# Patient Record
Sex: MALE | Race: BLACK OR AFRICAN AMERICAN | NOT HISPANIC OR LATINO | URBAN - METROPOLITAN AREA
[De-identification: names, ages, dates, MRNs, and addresses within clinical notes are randomized per-mention and may not be internally consistent; named-entity substitution may affect disease eponyms.]

---

## 2019-01-01 ENCOUNTER — EMERGENCY (EMERGENCY)
Facility: HOSPITAL | Age: 69
LOS: 0 days | Discharge: HOME | End: 2019-05-20
Attending: EMERGENCY MEDICINE
Payer: COMMERCIAL

## 2019-01-01 VITALS
RESPIRATION RATE: 17 BRPM | HEART RATE: 133 BPM | HEIGHT: 63 IN | WEIGHT: 119.05 LBS | OXYGEN SATURATION: 100 % | SYSTOLIC BLOOD PRESSURE: 92 MMHG | TEMPERATURE: 98 F | DIASTOLIC BLOOD PRESSURE: 68 MMHG

## 2019-01-01 VITALS
SYSTOLIC BLOOD PRESSURE: 116 MMHG | OXYGEN SATURATION: 100 % | RESPIRATION RATE: 17 BRPM | TEMPERATURE: 98 F | HEART RATE: 62 BPM | DIASTOLIC BLOOD PRESSURE: 62 MMHG

## 2019-01-01 DIAGNOSIS — R10.9 UNSPECIFIED ABDOMINAL PAIN: ICD-10-CM

## 2019-01-01 DIAGNOSIS — Z85.028 PERSONAL HISTORY OF OTHER MALIGNANT NEOPLASM OF STOMACH: ICD-10-CM

## 2019-01-01 DIAGNOSIS — R00.0 TACHYCARDIA, UNSPECIFIED: ICD-10-CM

## 2019-01-01 DIAGNOSIS — R53.1 WEAKNESS: ICD-10-CM

## 2019-01-01 LAB
APTT BLD: 27.2 SEC — LOW (ref 27.5–36.3)
BLD GP AB SCN SERPL QL: SIGNIFICANT CHANGE UP
CULTURE RESULTS: SIGNIFICANT CHANGE UP
CULTURE RESULTS: SIGNIFICANT CHANGE UP
HCT VFR BLD CALC: 31.6 % — LOW (ref 39–50)
HGB BLD-MCNC: 10.1 G/DL — LOW (ref 13–17)
INR BLD: 1.18 RATIO — HIGH (ref 0.88–1.16)
LACTATE SERPL-SCNC: 1.6 MMOL/L — SIGNIFICANT CHANGE UP (ref 0.7–2)
MCHC RBC-ENTMCNC: 27.4 PG — SIGNIFICANT CHANGE UP (ref 27–34)
MCHC RBC-ENTMCNC: 32 GM/DL — SIGNIFICANT CHANGE UP (ref 32–36)
MCV RBC AUTO: 85.9 FL — SIGNIFICANT CHANGE UP (ref 80–100)
NRBC # BLD: 0 /100 WBCS — SIGNIFICANT CHANGE UP (ref 0–0)
PLATELET # BLD AUTO: 206 K/UL — SIGNIFICANT CHANGE UP (ref 150–400)
PROTHROM AB SERPL-ACNC: 13.3 SEC — HIGH (ref 10–12.9)
RBC # BLD: 3.68 M/UL — LOW (ref 4.2–5.8)
RBC # FLD: 14.6 % — HIGH (ref 10.3–14.5)
SPECIMEN SOURCE: SIGNIFICANT CHANGE UP
SPECIMEN SOURCE: SIGNIFICANT CHANGE UP
WBC # BLD: 5.66 K/UL — SIGNIFICANT CHANGE UP (ref 3.8–10.5)
WBC # FLD AUTO: 5.66 K/UL — SIGNIFICANT CHANGE UP (ref 3.8–10.5)

## 2019-01-01 PROCEDURE — 74176 CT ABD & PELVIS W/O CONTRAST: CPT | Mod: 26

## 2019-01-01 PROCEDURE — 93010 ELECTROCARDIOGRAM REPORT: CPT

## 2019-01-01 PROCEDURE — 99285 EMERGENCY DEPT VISIT HI MDM: CPT

## 2019-01-01 RX ORDER — FENTANYL CITRATE 50 UG/ML
50 INJECTION INTRAVENOUS ONCE
Refills: 0 | Status: DISCONTINUED | OUTPATIENT
Start: 2019-01-01 | End: 2019-01-01

## 2019-01-01 RX ORDER — METOCLOPRAMIDE HCL 10 MG
10 TABLET ORAL ONCE
Refills: 0 | Status: COMPLETED | OUTPATIENT
Start: 2019-01-01 | End: 2019-01-01

## 2019-01-01 RX ORDER — IOHEXOL 300 MG/ML
30 INJECTION, SOLUTION INTRAVENOUS ONCE
Refills: 0 | Status: COMPLETED | OUTPATIENT
Start: 2019-01-01 | End: 2019-01-01

## 2019-01-01 RX ORDER — ESOMEPRAZOLE MAGNESIUM 40 MG/1
1 CAPSULE, DELAYED RELEASE ORAL
Qty: 0 | Refills: 0 | DISCHARGE

## 2019-01-01 RX ORDER — PIPERACILLIN AND TAZOBACTAM 4; .5 G/20ML; G/20ML
3.38 INJECTION, POWDER, LYOPHILIZED, FOR SOLUTION INTRAVENOUS ONCE
Refills: 0 | Status: DISCONTINUED | OUTPATIENT
Start: 2019-01-01 | End: 2019-01-01

## 2019-01-01 RX ORDER — DRONABINOL 2.5 MG
1 CAPSULE ORAL
Qty: 0 | Refills: 0 | DISCHARGE

## 2019-01-01 RX ORDER — ENOXAPARIN SODIUM 100 MG/ML
0 INJECTION SUBCUTANEOUS
Qty: 0 | Refills: 0 | DISCHARGE

## 2019-01-01 RX ORDER — ATORVASTATIN CALCIUM 80 MG/1
1 TABLET, FILM COATED ORAL
Qty: 0 | Refills: 0 | DISCHARGE

## 2019-01-01 RX ORDER — FAMOTIDINE 10 MG/ML
20 INJECTION INTRAVENOUS ONCE
Refills: 0 | Status: COMPLETED | OUTPATIENT
Start: 2019-01-01 | End: 2019-01-01

## 2019-01-01 RX ORDER — SODIUM CHLORIDE 9 MG/ML
1000 INJECTION INTRAMUSCULAR; INTRAVENOUS; SUBCUTANEOUS ONCE
Refills: 0 | Status: COMPLETED | OUTPATIENT
Start: 2019-01-01 | End: 2019-01-01

## 2019-01-01 RX ORDER — SITAGLIPTIN 50 MG/1
1 TABLET, FILM COATED ORAL
Qty: 0 | Refills: 0 | DISCHARGE

## 2019-01-01 RX ORDER — INSULIN DETEMIR 100/ML (3)
8 INSULIN PEN (ML) SUBCUTANEOUS
Qty: 0 | Refills: 0 | DISCHARGE

## 2019-01-01 RX ADMIN — IOHEXOL 30 MILLILITER(S): 300 INJECTION, SOLUTION INTRAVENOUS at 20:46

## 2019-05-19 NOTE — ED ADULT TRIAGE NOTE - CHIEF COMPLAINT QUOTE
Recent abdominal surgery 04/18/2019  at [facility],   reported generalized weakness , severe abdominal pain with nausea and vomiting, stated "I feel like i'm gonna pass out"  onset 1 hour ago

## 2019-05-19 NOTE — ED ADULT NURSE REASSESSMENT NOTE - NS ED NURSE REASSESS COMMENT FT1
Patient refuse for an insertion of an peripheral line, tried with another nurse, patient still refuse. Made Dr. Tegaue aware that patient would like to leave. Dr. Teague request for patient to have an CT scan to be done.  Dr. Teague, had discontinue the antibiotics .  Made patient aware of the next POC. Will continue to monitor.

## 2019-05-19 NOTE — ED PROVIDER NOTE - NSFOLLOWUPCLINICS_GEN_ALL_ED_FT
[facility]  Hematology/Oncology  [address] [id number]  Phone: [phone]  Fax:   Follow Up Time: 4-6 Days

## 2019-05-19 NOTE — ED PROVIDER NOTE - PROGRESS NOTE DETAILS
Results reported to patient--grossly benign, labs wnl (some labs clotted, pt. refusing redraw as he feels better)  Pt. reports feeling better after meds  pt. agrees to f/u with primary care outpt. as well as private heme/onc and surgery/GI  pt. understands to return to ED if symptoms worsen; will d/c

## 2019-05-19 NOTE — ED PROVIDER NOTE - CLINICAL SUMMARY MEDICAL DECISION MAKING FREE TEXT BOX
69 yo M with acute abd pain, r/o acute abd given history, doubt pancreatitis/ischemia/obstruction/ACS  -basic labs, coags, trop, ckmb, blood cx, lactate, lipase, type and screen, CT ab/pel, ekg, cardiac monitor, iv, NS hydration bolus, zosyn, pepcid/reglan/fentanyl prn, PO contrast if tolerable   -f/u results, reeval

## 2019-05-19 NOTE — ED ADULT NURSE REASSESSMENT NOTE - NS ED NURSE REASSESS COMMENT FT1
Patient refuse for insertion of peripheral line, and refuse for redraw of  the green top. Patient anxiously wanting to leave the ER. Awaiting for CT scan to be done.

## 2019-05-19 NOTE — ED PROVIDER NOTE - PHYSICAL EXAMINATION
Vitals: tachy at 128, otherwise WNL  Gen: AAOx3, NAD, sitting uncomfortably in stretcher, non-toxic, answering questions appropriately   Head: ncat, perrla, eomi b/l  Neck: supple, no lymphadenopathy, no midline deviation  Heart: rrr, no m/r/g  Lungs: CTA b/l, no rales/ronchi/wheezes  Abd: soft, generaly tender, but non-distended, no rebound or guarding  Ext: no clubbing/cyanosis/edema  Neuro: sensation and muscle strength intact b/l, no focal weakness

## 2020-01-01 ENCOUNTER — EMERGENCY (EMERGENCY)
Facility: HOSPITAL | Age: 70
LOS: 1 days | Discharge: HOME | End: 2020-01-01
Attending: EMERGENCY MEDICINE | Admitting: INTERNAL MEDICINE
Payer: MEDICARE

## 2020-01-01 VITALS
RESPIRATION RATE: 17 BRPM | SYSTOLIC BLOOD PRESSURE: 132 MMHG | HEART RATE: 85 BPM | DIASTOLIC BLOOD PRESSURE: 89 MMHG | TEMPERATURE: 99 F | OXYGEN SATURATION: 98 %

## 2020-01-01 VITALS
RESPIRATION RATE: 16 BRPM | TEMPERATURE: 97 F | SYSTOLIC BLOOD PRESSURE: 106 MMHG | DIASTOLIC BLOOD PRESSURE: 70 MMHG | OXYGEN SATURATION: 86 % | HEART RATE: 56 BPM

## 2020-01-01 DIAGNOSIS — Z29.9 ENCOUNTER FOR PROPHYLACTIC MEASURES, UNSPECIFIED: ICD-10-CM

## 2020-01-01 DIAGNOSIS — Z02.9 ENCOUNTER FOR ADMINISTRATIVE EXAMINATIONS, UNSPECIFIED: ICD-10-CM

## 2020-01-01 DIAGNOSIS — D50.0 IRON DEFICIENCY ANEMIA SECONDARY TO BLOOD LOSS (CHRONIC): ICD-10-CM

## 2020-01-01 DIAGNOSIS — E11.22 TYPE 2 DIABETES MELLITUS WITH DIABETIC CHRONIC KIDNEY DISEASE: ICD-10-CM

## 2020-01-01 DIAGNOSIS — Z71.89 OTHER SPECIFIED COUNSELING: ICD-10-CM

## 2020-01-01 DIAGNOSIS — C16.9 MALIGNANT NEOPLASM OF STOMACH, UNSPECIFIED: ICD-10-CM

## 2020-01-01 DIAGNOSIS — R64 CACHEXIA: ICD-10-CM

## 2020-01-01 DIAGNOSIS — R62.7 ADULT FAILURE TO THRIVE: ICD-10-CM

## 2020-01-01 DIAGNOSIS — Z90.3 ACQUIRED ABSENCE OF STOMACH [PART OF]: Chronic | ICD-10-CM

## 2020-01-01 DIAGNOSIS — G93.41 METABOLIC ENCEPHALOPATHY: ICD-10-CM

## 2020-01-01 LAB
ALBUMIN SERPL ELPH-MCNC: 3 G/DL — LOW (ref 3.3–5)
ALP SERPL-CCNC: 829 U/L — HIGH (ref 40–120)
ALT FLD-CCNC: 44 U/L — HIGH (ref 4–41)
AMMONIA BLD-MCNC: 25 UMOL/L — SIGNIFICANT CHANGE UP (ref 11–55)
ANION GAP SERPL CALC-SCNC: 20 MMO/L — HIGH (ref 7–14)
ANISOCYTOSIS BLD QL: SLIGHT — SIGNIFICANT CHANGE UP
AST SERPL-CCNC: 64 U/L — HIGH (ref 4–40)
BASE EXCESS BLDV CALC-SCNC: 0.5 MMOL/L — SIGNIFICANT CHANGE UP
BASOPHILS # BLD AUTO: 0.02 K/UL — SIGNIFICANT CHANGE UP (ref 0–0.2)
BASOPHILS NFR BLD AUTO: 0.1 % — SIGNIFICANT CHANGE UP (ref 0–2)
BASOPHILS NFR SPEC: 0 % — SIGNIFICANT CHANGE UP (ref 0–2)
BILIRUB SERPL-MCNC: 1.7 MG/DL — HIGH (ref 0.2–1.2)
BLD GP AB SCN SERPL QL: NEGATIVE — SIGNIFICANT CHANGE UP
BLOOD GAS VENOUS - CREATININE: SIGNIFICANT CHANGE UP MG/DL (ref 0.5–1.3)
BLOOD GAS VENOUS - FIO2: 21 — SIGNIFICANT CHANGE UP
BUN SERPL-MCNC: 111 MG/DL — HIGH (ref 7–23)
CALCIUM SERPL-MCNC: 8.5 MG/DL — SIGNIFICANT CHANGE UP (ref 8.4–10.5)
CHLORIDE BLDV-SCNC: 100 MMOL/L — SIGNIFICANT CHANGE UP (ref 96–108)
CHLORIDE SERPL-SCNC: 94 MMOL/L — LOW (ref 98–107)
CO2 SERPL-SCNC: 20 MMOL/L — LOW (ref 22–31)
CREAT SERPL-MCNC: 1.53 MG/DL — HIGH (ref 0.5–1.3)
EOSINOPHIL # BLD AUTO: 0 K/UL — SIGNIFICANT CHANGE UP (ref 0–0.5)
EOSINOPHIL NFR BLD AUTO: 0 % — SIGNIFICANT CHANGE UP (ref 0–6)
EOSINOPHIL NFR FLD: 0 % — SIGNIFICANT CHANGE UP (ref 0–6)
GAS PNL BLDV: 134 MMOL/L — LOW (ref 136–146)
GLUCOSE BLDV-MCNC: 67 MG/DL — LOW (ref 70–99)
GLUCOSE SERPL-MCNC: 64 MG/DL — LOW (ref 70–99)
HCO3 BLDV-SCNC: 25 MMOL/L — SIGNIFICANT CHANGE UP (ref 20–27)
HCT VFR BLD CALC: 17.9 % — CRITICAL LOW (ref 39–50)
HCT VFR BLD CALC: 31.4 % — LOW (ref 39–50)
HCT VFR BLDV CALC: 16.1 % — CRITICAL LOW (ref 39–51)
HGB BLD-MCNC: 10.8 G/DL — LOW (ref 13–17)
HGB BLD-MCNC: 5.6 G/DL — CRITICAL LOW (ref 13–17)
HGB BLDV-MCNC: 5.1 G/DL — CRITICAL LOW (ref 13–17)
IMM GRANULOCYTES NFR BLD AUTO: 1.6 % — HIGH (ref 0–1.5)
LACTATE BLDV-MCNC: 4.8 MMOL/L — CRITICAL HIGH (ref 0.5–2)
LG PLATELETS BLD QL AUTO: SLIGHT — SIGNIFICANT CHANGE UP
LYMPHOCYTES # BLD AUTO: 0.43 K/UL — LOW (ref 1–3.3)
LYMPHOCYTES # BLD AUTO: 2.1 % — LOW (ref 13–44)
LYMPHOCYTES NFR SPEC AUTO: 4 % — LOW (ref 13–44)
MACROCYTES BLD QL: SLIGHT — SIGNIFICANT CHANGE UP
MANUAL SMEAR VERIFICATION: SIGNIFICANT CHANGE UP
MCHC RBC-ENTMCNC: 30.3 PG — SIGNIFICANT CHANGE UP (ref 27–34)
MCHC RBC-ENTMCNC: 30.4 PG — SIGNIFICANT CHANGE UP (ref 27–34)
MCHC RBC-ENTMCNC: 31.3 % — LOW (ref 32–36)
MCHC RBC-ENTMCNC: 34.4 % — SIGNIFICANT CHANGE UP (ref 32–36)
MCV RBC AUTO: 88.2 FL — SIGNIFICANT CHANGE UP (ref 80–100)
MCV RBC AUTO: 97.3 FL — SIGNIFICANT CHANGE UP (ref 80–100)
MICROCYTES BLD QL: SLIGHT — SIGNIFICANT CHANGE UP
MONOCYTES # BLD AUTO: 0.57 K/UL — SIGNIFICANT CHANGE UP (ref 0–0.9)
MONOCYTES NFR BLD AUTO: 2.8 % — SIGNIFICANT CHANGE UP (ref 2–14)
MONOCYTES NFR BLD: 2 % — SIGNIFICANT CHANGE UP (ref 2–9)
NEUTROPHIL AB SER-ACNC: 89 % — HIGH (ref 43–77)
NEUTROPHILS # BLD AUTO: 18.95 K/UL — HIGH (ref 1.8–7.4)
NEUTROPHILS NFR BLD AUTO: 93.4 % — HIGH (ref 43–77)
NEUTS BAND # BLD: 5 % — SIGNIFICANT CHANGE UP (ref 0–6)
NRBC # BLD: 0 /100WBC — SIGNIFICANT CHANGE UP
NRBC # FLD: 0.07 K/UL — SIGNIFICANT CHANGE UP (ref 0–0)
NRBC # FLD: 0.09 K/UL — SIGNIFICANT CHANGE UP (ref 0–0)
PCO2 BLDV: 40 MMHG — LOW (ref 41–51)
PH BLDV: 7.41 PH — SIGNIFICANT CHANGE UP (ref 7.32–7.43)
PLATELET # BLD AUTO: 36 K/UL — LOW (ref 150–400)
PLATELET # BLD AUTO: 53 K/UL — LOW (ref 150–400)
PLATELET COUNT - ESTIMATE: SIGNIFICANT CHANGE UP
PMV BLD: SIGNIFICANT CHANGE UP FL (ref 7–13)
PMV BLD: SIGNIFICANT CHANGE UP FL (ref 7–13)
PO2 BLDV: 44 MMHG — HIGH (ref 35–40)
POLYCHROMASIA BLD QL SMEAR: SLIGHT — SIGNIFICANT CHANGE UP
POTASSIUM BLDV-SCNC: 4.5 MMOL/L — SIGNIFICANT CHANGE UP (ref 3.4–4.5)
POTASSIUM SERPL-MCNC: 5.3 MMOL/L — SIGNIFICANT CHANGE UP (ref 3.5–5.3)
POTASSIUM SERPL-SCNC: 5.3 MMOL/L — SIGNIFICANT CHANGE UP (ref 3.5–5.3)
PROT SERPL-MCNC: 6.2 G/DL — SIGNIFICANT CHANGE UP (ref 6–8.3)
RBC # BLD: 1.84 M/UL — LOW (ref 4.2–5.8)
RBC # BLD: 3.56 M/UL — LOW (ref 4.2–5.8)
RBC # FLD: 18.6 % — HIGH (ref 10.3–14.5)
RBC # FLD: 25.6 % — HIGH (ref 10.3–14.5)
RH IG SCN BLD-IMP: POSITIVE — SIGNIFICANT CHANGE UP
RH IG SCN BLD-IMP: POSITIVE — SIGNIFICANT CHANGE UP
SAO2 % BLDV: 76.8 % — SIGNIFICANT CHANGE UP (ref 60–85)
SCHISTOCYTES BLD QL AUTO: SIGNIFICANT CHANGE UP
SODIUM SERPL-SCNC: 134 MMOL/L — LOW (ref 135–145)
SPECIMEN SOURCE: SIGNIFICANT CHANGE UP
WBC # BLD: 19.6 K/UL — HIGH (ref 3.8–10.5)
WBC # BLD: 19.76 K/UL — HIGH (ref 3.8–10.5)
WBC # FLD AUTO: 19.6 K/UL — HIGH (ref 3.8–10.5)
WBC # FLD AUTO: 19.76 K/UL — HIGH (ref 3.8–10.5)

## 2020-01-01 PROCEDURE — 99285 EMERGENCY DEPT VISIT HI MDM: CPT | Mod: 25

## 2020-01-01 PROCEDURE — 76937 US GUIDE VASCULAR ACCESS: CPT | Mod: 26,RT

## 2020-01-01 RX ORDER — MORPHINE SULFATE 50 MG/1
2 CAPSULE, EXTENDED RELEASE ORAL ONCE
Refills: 0 | Status: DISCONTINUED | OUTPATIENT
Start: 2020-01-01 | End: 2020-01-01

## 2020-01-01 RX ORDER — HYDROMORPHONE HYDROCHLORIDE 2 MG/ML
0.5 INJECTION INTRAMUSCULAR; INTRAVENOUS; SUBCUTANEOUS
Refills: 0 | Status: DISCONTINUED | OUTPATIENT
Start: 2020-01-01 | End: 2020-01-01

## 2020-01-01 RX ORDER — SODIUM CHLORIDE 9 MG/ML
1000 INJECTION, SOLUTION INTRAVENOUS ONCE
Refills: 0 | Status: COMPLETED | OUTPATIENT
Start: 2020-01-01 | End: 2020-01-01

## 2020-01-01 RX ORDER — ACETAMINOPHEN 500 MG
325 TABLET ORAL EVERY 4 HOURS
Refills: 0 | Status: DISCONTINUED | OUTPATIENT
Start: 2020-01-01 | End: 2020-01-01

## 2020-01-01 RX ORDER — SODIUM CHLORIDE 9 MG/ML
1000 INJECTION, SOLUTION INTRAVENOUS
Refills: 0 | Status: DISCONTINUED | OUTPATIENT
Start: 2020-01-01 | End: 2020-01-01

## 2020-01-01 RX ORDER — HYDROMORPHONE HYDROCHLORIDE 2 MG/ML
0.5 INJECTION INTRAMUSCULAR; INTRAVENOUS; SUBCUTANEOUS
Qty: 0 | Refills: 0 | DISCHARGE
Start: 2020-01-01

## 2020-01-01 RX ORDER — OXYCODONE HYDROCHLORIDE 5 MG/1
5 TABLET ORAL EVERY 4 HOURS
Refills: 0 | Status: DISCONTINUED | OUTPATIENT
Start: 2020-01-01 | End: 2020-01-01

## 2020-01-01 RX ADMIN — SODIUM CHLORIDE 1000 MILLILITER(S): 9 INJECTION, SOLUTION INTRAVENOUS at 18:42

## 2020-01-01 RX ADMIN — MORPHINE SULFATE 2 MILLIGRAM(S): 50 CAPSULE, EXTENDED RELEASE ORAL at 23:23

## 2020-01-01 RX ADMIN — SODIUM CHLORIDE 50 MILLILITER(S): 9 INJECTION, SOLUTION INTRAVENOUS at 08:36

## 2020-01-01 RX ADMIN — MORPHINE SULFATE 2 MILLIGRAM(S): 50 CAPSULE, EXTENDED RELEASE ORAL at 23:08

## 2020-01-12 NOTE — ED PROVIDER NOTE - FAMILY DETAILS FREE TEXT FOR MDM ADDL HISTORY OBTAINED FROM QUESTION
extensive goals of care discussion.  pt doesn't want parenteral feeds, has been consistent in saying he does not.    also no cpr, no intubation.  discussed concept of making him comfortable and family is considering hospice

## 2020-01-12 NOTE — ED PROVIDER NOTE - PHYSICAL EXAMINATION
*GEN:   very pale, jaundiced, uncomfortable, AOx3  *EYES:   pupils equally round and reactive to light, extra-occular movements intact  *HEENT:   airway patent, moist mucosal membranes, full ROM neck  *CV:   regular rate and rhythm  *RESP:   clear to auscultation bilaterally, non-labored  *ABD:   exquisitely tender to palpation throughout  *:   no cva/flank tenderness  *EXTREM:   no MSK tenderness, full ROM throughout, no leg swelling  *SKIN:   dry, intact  *NEURO:   AOx3, no focal weakness or loss of sensation

## 2020-01-12 NOTE — ED PROVIDER NOTE - CLINICAL SUMMARY MEDICAL DECISION MAKING FREE TEXT BOX
sick but stable appearing 69M w/ cancer - will check CTAP for intra-abd pathology, CTH given confusion, labs, further discuss goals of care with family sick but stable appearing 69M w/ cancer - will check CTAP for intra-abd pathology, CTH given confusion, labs, further discuss goals of care with family    aracelis: pt here with gastric cancer, decreased po over the last week.  family wife, daughter CATHY all present  extensive goals of care discussion.  pt doesn't want parenteral feeds, has been consistent in saying he does not.  pt interested in hospice.    also no cpr, no intubation.  discussed concept of making him comfortable and family is considering hospice

## 2020-01-12 NOTE — ED ADULT NURSE REASSESSMENT NOTE - NS ED NURSE REASSESS COMMENT FT1
blood transfusion initiated, checks doen bedside with second RN Shaq, consent in chart, pt and family educated on signs/symptoms of reaction tolerating well will continue to monitor

## 2020-01-12 NOTE — ED PROVIDER NOTE - CARE PLAN
Principal Discharge DX:	Cachexia  Secondary Diagnosis:	Malignant neoplasm of stomach, unspecified location

## 2020-01-12 NOTE — ED ADULT TRIAGE NOTE - CHIEF COMPLAINT QUOTE
Pt with change in mental status since last night , and ? low BP  108/72./  Pt with hx stomach ca  stage 4 mets to kidney

## 2020-01-12 NOTE — ED PROVIDER NOTE - OBJECTIVE STATEMENT
69M w/ pmh gastric CA s/p gastrectomy, s/p chemo/radiation (but stopped 1 yr ago) -- bib family for decr po x 1 wk, intermittent confusion x 2 days. Complexion more pale / yellow than baseline as well past few days. +chronic abd pain. No fever, n/v/d/c, chest pain, cough, sob, dizziness, dysuria/hematuria. No recent travel, medication change, illness, or hospitalization.     Preliminary goals of care discussion: want medications / fluid interventions, would want cpr, likely no surgical procedure, no intubation.

## 2020-01-12 NOTE — ED PROVIDER NOTE - ATTENDING CONTRIBUTION TO CARE
aracelis: pt here with gastric cancer, decreased po over the last week.  family wife, daughter CATHY all present  extensive goals of care discussion.  pt doesn't want parenteral feeds, has been consistent in saying he does not.  pt interested in hospice.

## 2020-01-12 NOTE — ED ADULT NURSE NOTE - OBJECTIVE STATEMENT
Pt rec'd to ED R#10, brought in by family for failure to thrive and AMS. As per family, pt has been having very minimal PO intake x1 week, since last night pt not acting himself and lethargic as per family. Family tried to obtain BP at home and were unable to. Pt denies any abdominal pain/ N/V/ CP/ SOB. Pt appears very frail, cachectic, jaundice, pale. VSS upon arrival to room, satting well on 2L NC. IV inserted, pt placed on CCM. Fluids infusing as per EMAR. Bed in lowest locked position, call bell within reach. Family at bedside.

## 2020-01-12 NOTE — ED PROVIDER NOTE - PROGRESS NOTE DETAILS
Resident Hersimran: Pt received as a sign out. Family at bedside (including wife and brother). Pt is informed about low hgb. He would like to receive blood transfusion. Risks and benefits are discussed. All questions are answered. Blood transfusion form is signed. Resident Caleb: MOLST form completed -placed in the chart: DNR, DNI, no tube feeds  Discussed case with Johanne (Nurse) - [facility] - will call the family and see the patient on floor tomorrow.   Resident Caleb Kat: Discussed the case with Dr. Mccracken - agrees with admission. MAR is paged.

## 2020-01-12 NOTE — ED ADULT NURSE REASSESSMENT NOTE - NS ED NURSE REASSESS COMMENT FT1
received report from SAM Whitney pt aox3 in no apparent distress VSS denies complaints labs sent will continue to monitor

## 2020-01-12 NOTE — ED PROCEDURE NOTE - ATTENDING CONTRIBUTION TO CARE
Attending Attestation: Dr. Mack BRISCOE supervised the PA and was personally present during key portions of the procedure.

## 2020-01-13 NOTE — H&P ADULT - PROBLEM SELECTOR PLAN 4
-Likely GI losses given elevated BUN and no external losses  -Transfuse for comfort  -Repeat CBC/lactate to determine if needs additional blood

## 2020-01-13 NOTE — DISCHARGE NOTE PROVIDER - NSDCCPCAREPLAN_GEN_ALL_CORE_FT
PRINCIPAL DISCHARGE DIAGNOSIS  Diagnosis: Cachexia  Assessment and Plan of Treatment: - discussed goals of care with patient and spouse. Patient is DNR/DNI.  MOLST form in chart.. Patient was evaluated by Hospice team. Patient admitted to [facility]      SECONDARY DISCHARGE DIAGNOSES  Diagnosis: Malignant neoplasm of stomach, unspecified location  Assessment and Plan of Treatment: - discussed goals of care with patient and spouse. Patient is DNR/DNI.  MOLST form in chart.. Patient was evaluated by Hospice team. Patient admitted to [facility]

## 2020-01-13 NOTE — H&P ADULT - PROBLEM SELECTOR PLAN 3
-2/2 anemia and underlying malignancy  -Likely transitioning to hospice per discussion with wife  -Pain control, regular diet, hospice evaluation as above

## 2020-01-13 NOTE — DISCHARGE NOTE PROVIDER - HOSPITAL COURSE
68 yo metastatic gastric cancer p/w FTT, anemia, and encephalopathy.  Getting blood but likely transition to hospice        Goals of care, counseling/discussion- ED team discussed goals of care with patient and patient is DNR/DNI.  MOLST form in chart.. Hospice  was called, discussed with patient's wife. Pt to be admitted to [facility] inpatient Hospice care        Metabolic encephalopathy- Highest suspicion for uremic encephalopathy given initial labs    - Has returned to baseline per wife        Failure to thrive in adult.- 2/2 anemia and underlying malignancy    - transitioning to hospice per discussion with wife. Pain control, regular diet, hospice evaluation as above        Anemia due to blood loss-Likely GI losses given elevated BUN and no external losses    -Transfuse for comfort. Pt s/p 2u PRBC on 1/12/20        Malignant neoplasm of stomach -Reportedly follows with oncologist at [facility]    - Pain management - Istop reviewed [id number] - percocet 5/325 at outpatient last by Dr. Sotelo    Per discussion with Attending and Hospice team pt ready to be discharged to in hospice today 68 yo metastatic gastric cancer p/w FTT, anemia, and encephalopathy.              Metabolic encephalopathy- Highest suspicion for uremic encephalopathy given initial labs    - Has returned to baseline per wife        Severe protein calorie malnutrition .- 2/2 underlying malignancy    - transitioning to hospice per discussion with wife. Pain control, regular diet, hospice evaluation as above        Anemia due to blood loss-Likely GI losses 2/2 gastric ca given elevated BUN and no external losses    -Transfuse for comfort. Pt s/p 2u PRBC on 1/12/20        Malignant neoplasm of stomach -Reportedly follows with oncologist at [facility]. No longer on any disease modifying therapy    -CT abd/pelvis showed widely spread stage 4 disease.     - Pain management - Istop reviewed [id number] - percocet 5/325 at outpatient last by Dr. Sotelo.     -started iv diaudid prn for cancer pain.         Advanced care planning     GOC discussed with wife- consented for DNI/DNR. No enteral feeding. Comfort care only. MOLST form in chart. Pt was evaluated by hospice service and was  discharged to inpt hospice today

## 2020-01-13 NOTE — H&P ADULT - PROBLEM SELECTOR PLAN 6
-Not on any medications  -Given BG low rather than high, will start D5NS at 50 for supplementation  -Given possible transition to hospice, will not order SSI/acck

## 2020-01-13 NOTE — H&P ADULT - PROBLEM SELECTOR PLAN 8
1.  Name of PCP:  2.  PCP Contacted on Admission: [ ] Y    [ ] N    3.  PCP contacted at Discharge: [ ] Y    [ ] N    [ ] N/A  4.  Post-Discharge Appointment Date and Location:  5.  Summary of Handoff given to PCP: 1.  Name of PCP: Dr. Sotelo  2.  PCP Contacted on Admission: [ ] Y    [x] N    3.  PCP contacted at Discharge: [ ] Y    [ ] N    [ ] N/A  4.  Post-Discharge Appointment Date and Location:  5.  Summary of Handoff given to PCP:

## 2020-01-13 NOTE — H&P ADULT - HISTORY OF PRESENT ILLNESS
Denis Romano is a 69 year old man with a history of gastric cancer widely metastatic and diabetes not on medications who presents for failure to thrive and confusion.    Patient was sleepy after a dose of morphine in the ED and declined to provide history and asked that questions be directed to wife, so all history obtained from patient's wife.    She reports he was in his usual state of chronic poor health until about 3 days ago when his sister passed away from breast cancer.  He subsequently had worsened abdominal pain, poor PO intake, and confusion characterized by hallucinations.  She did not note any black tarry stools or external bleeding.  His confusion was intermittent.  Due to these symptoms, she brought him into [facility] ED for further evaluation.    In the ED, he was afebrile with low saturation to 86% on RA improved to 100% on 2-4 L.  Diagnostics revealed a WBC of 19.6, hgb 5.6, platelet count 53,  w/ Cr 1.53, and elevated LFTs.  CT a/p showed gastrectomy with widely metastatic malignancy with concern for osseous mets, periotenal carcinomatosis, pulmonary and perinephric mets.  He was given LR 1 L and morphine 2 and 2 units of pRBC.  The ED also had GOC discussion and patient and wife elected for DNR/DNI and hospice evaluation in the morning.  Hospice was called by the ED and per their note will see him in the daytime.  He was admitted for further management.    On evaluation, he said he felt better but asked that his wife answer questions.

## 2020-01-13 NOTE — ED ADULT NURSE REASSESSMENT NOTE - NS ED NURSE REASSESS COMMENT FT1
blood ran over 2 hrs. md walton aware and approved. no transfusion reactions. vss. enodrsed to primary silvana delcid

## 2020-01-13 NOTE — GOALS OF CARE CONVERSATION - ADVANCED CARE PLANNING - CONVERSATION DETAILS
[facility] - Consents signed. Pt has been approved for an InPt bed and a bed is available at [facility] for today. Hospice is ready to render car.e

## 2020-01-13 NOTE — DISCHARGE NOTE NURSING/CASE MANAGEMENT/SOCIAL WORK - PATIENT PORTAL LINK FT
You can access the FollowMyHealth Patient Portal offered by [facility] by registering at the following website: [email or web address]. By joining [employer]’s FollowMyHealth portal, you will also be able to view your health information using other applications (apps) compatible with our system.

## 2020-01-13 NOTE — H&P ADULT - PROBLEM SELECTOR PLAN 2
-Highest suspicion for uremic encephalopathy given initial labs  -Has returned to baseline per wife  -Will defer further evaluation given current goals of care

## 2020-01-13 NOTE — H&P ADULT - NSHPPHYSICALEXAM_GEN_ALL_CORE
Physical exam:  Constitutional-Vitals signs reviewed and afebrile, HR , bp 106-148/, rr 14-20, satting 86% initially improved to 100% on 2L,asleep but awakens easily, not in acute distress, cachectic  Neuro-asleep but awakens easily, does not wish to answer questions, moving all extremities, face symmetric  Eyes-pupils equally round and reactive to light, non icteric, no discharge noted  Ears, nose, mouth, throat-no abnormal discharge, moist mucous membranes, oropharynx clear without noted exudate  CV-regular rate and rhythm, no rubs, murmurs, gallops appreciated,   Resp-clear to auscultation bilaterally, normal respiratory effort,   GI-abdomen tender throughout,   -not examined  MSK-normal range of motion of bilateral elbows and knees, no obvious deformities   Skin-warm, dry, no rash appreciated  Psych-calm, did not wish to answer questions, normal affect, not attending to internal stimuli

## 2020-01-13 NOTE — H&P ADULT - PROBLEM SELECTOR PLAN 1
-ED team discussed goals of care with patient and patient is DNR/DNI with plans to meet with hospice team in the daytime.  MOLST form in chart.  -Confirmed DNR/DNI desire for hospice discussion with patient's wife  -Given current goals of care will minimize additional workup and interventions  -Continue goals of care discussions in the daytime

## 2020-01-13 NOTE — H&P ADULT - PROBLEM SELECTOR PLAN 5
-Reportedly follows with oncologist at [facility]  -Ongoing goals of care as above  -Pain managment -Reportedly follows with oncologist at [facility]  -Ongoing goals of care as above  -Pain management - Istop reviewed [id number] - percocet 5/325 at outpatient last by Dr. Sotelo

## 2020-01-13 NOTE — DISCHARGE NOTE PROVIDER - NSDCMRMEDTOKEN_GEN_ALL_CORE_FT
acetaminophen 650 mg rectal suppository: 1 suppository(ies) rectal every 6 hours, As Needed  HYDROmorphone: 0.5 milligram(s) intravenous every 3 hours, As Needed

## 2020-01-13 NOTE — H&P ADULT - ASSESSMENT
69 year old man with a history of gastric cancer widely metastatic and diabetes not on medications who presents for failure to thrive and confusion.

## 2020-01-13 NOTE — H&P ADULT - NSHPLABSRESULTS_GEN_ALL_CORE
Diagnostics reviewed and remarkable for:  CBC w/ WBC 19.6, hgb 5.6, platelet 53  BMP w/ , Cr 1.53, AG 20  NH3 25  LFT w/ albumin 3, bili 1.7, AST 64, ALT 44, alk phos 829  Lipase 7.4  Mg 2.5, phos 6.2  VBG 7.41/40/25 lactate 4.8  CT a/p w/ gastrectomy and extensive mets (bones, peritoneum, lungs, perinephric)

## 2020-01-13 NOTE — H&P ADULT - PROBLEM SELECTOR PLAN 7
Likely ongoing bleeding and planning to transition to hospice, low benefit of pharmacologic prophylaxis.  Improve score 3 with elevated VTE risk.  SCD for prophylaxis.

## 2020-01-15 LAB
-  AMPICILLIN: SIGNIFICANT CHANGE UP
-  CIPROFLOXACIN: SIGNIFICANT CHANGE UP
-  NITROFURANTOIN: SIGNIFICANT CHANGE UP
-  TETRACYCLINE: SIGNIFICANT CHANGE UP
-  VANCOMYCIN: SIGNIFICANT CHANGE UP
BACTERIA UR CULT: SIGNIFICANT CHANGE UP
METHOD TYPE: SIGNIFICANT CHANGE UP
ORGANISM # SPEC MICROSCOPIC CNT: SIGNIFICANT CHANGE UP
ORGANISM # SPEC MICROSCOPIC CNT: SIGNIFICANT CHANGE UP

## 2020-01-15 RX ORDER — ACETAMINOPHEN 500 MG
1 TABLET ORAL
Qty: 0 | Refills: 0 | DISCHARGE

## 2020-03-26 NOTE — ED PROVIDER NOTE - NS_BEDUNITTYPES_ED_ALL_ED
"Patient: Alex Brown    Procedure Summary     Date:  03/25/20 Room / Location:  [facility] OR  / [facility] MAIN OR    Anesthesia Start:  2155 Anesthesia Stop:  2342    Procedure:  EXPLORATORY LAPAROTOMY,OPEN ADHESIOLYSIS,REPAIR OF PERINEUM (N/A Abdomen) Diagnosis:      Surgeon:  Kali Wilcox MD Provider:  Sabino Guy MD    Anesthesia Type:  general ASA Status:  2 - Emergent          Anesthesia Type: general    Vitals  Vitals Value Taken Time   /87 3/26/2020 12:50 AM   Temp 36.9 °C (98.4 °F) 3/25/2020 11:41 PM   Pulse 88 3/26/2020  1:02 AM   Resp 16 3/25/2020 11:41 PM   SpO2 98 % 3/26/2020  1:02 AM   Vitals shown include unvalidated device data.        Post Anesthesia Care and Evaluation    Patient location during evaluation: bedside  Patient participation: complete - patient participated  Level of consciousness: awake and alert  Pain management: adequate  Airway patency: patent  Anesthetic complications: No anesthetic complications    Cardiovascular status: acceptable  Respiratory status: acceptable  Hydration status: acceptable    Comments: /93 (BP Location: Left arm, Patient Position: Lying)   Pulse 100   Temp 36.9 °C (98.4 °F) (Oral)   Resp 16   Ht 167.6 cm (66\")   Wt 44.9 kg (99 lb)   SpO2 98%   BMI 15.98 kg/m²       " MEDICINE

## 2020-12-24 NOTE — ED PROVIDER NOTE - OBJECTIVE STATEMENT
67 yo M with acute abd pain after eating.  Pt. had stomach resection last month and [facility] for stomach cancer.  Scheduled to receive radiation treatment started next week if he gains enough weight.  Since surgery, pt. had frequent abd pain after eating and chronic diarrhea.  Pt. became very concerned because after eating this evening, pt. experience severe, acute, stabbing abd pain in epigastrium, which prompted hospital visit.  + nausea and vomiting.  Pt. admits now that he's in ER, symptoms have calmed down significantly.  Pt. admits he has a high tolerance for pain and prefers to avoid taking pain medication--"he has plenty at home."  No other complaints or inciting event.  Pt. is otherwise at baseline.  ROS: negative for fever, cough, headache, chest pain, shortness of breath, diarrhea, rash, paresthesia, and weakness--all other systems reviewed are negative.   PMH: stomach CA s/p resection; Meds: Forgets names, See EMR when list obtained from family; SH: Denies smoking/drinking/drug use
Less than or equal to Normal Limit

## 2022-12-16 NOTE — H&P ADULT - PROBLEM SELECTOR PROBLEM 6
no Type 2 diabetes mellitus with stage 3 chronic kidney disease, without long-term current use of insulin

## 2025-07-18 NOTE — ED ADULT NURSE NOTE - NSFALLRSKINDICATORS_ED_ALL_ED
Rounded on patient.  Patient awake in bed watching TV with knee straight. Patient ambulated to bathroom with assist x 1 and FWW and returned to bed.  Patient tolerated well. Left knee dressing is clean, dry, and intact.  Ice reapplied. Pedal pulses 2+, movement/sensation intact.  Vitals are stable. Patient rates left knee pain 2/10 and scheduled medication given as ordered.  IVPB Ancef 2 g started as ordered.  Call light and possessions within reach.  SCDs on.  Bed in lowest position and alarm on. Patient denies any other needs at this time.    yes